# Patient Record
Sex: MALE | Race: WHITE | Employment: FULL TIME | URBAN - METROPOLITAN AREA
[De-identification: names, ages, dates, MRNs, and addresses within clinical notes are randomized per-mention and may not be internally consistent; named-entity substitution may affect disease eponyms.]

---

## 2017-10-06 ENCOUNTER — OFFICE VISIT (OUTPATIENT)
Dept: FAMILY MEDICINE CLINIC | Age: 57
End: 2017-10-06

## 2017-10-06 VITALS
OXYGEN SATURATION: 96 % | HEIGHT: 68 IN | SYSTOLIC BLOOD PRESSURE: 130 MMHG | DIASTOLIC BLOOD PRESSURE: 68 MMHG | BODY MASS INDEX: 25.85 KG/M2 | HEART RATE: 70 BPM | WEIGHT: 170.6 LBS

## 2017-10-06 DIAGNOSIS — N48.9 PENILE LESION: Primary | ICD-10-CM

## 2017-10-06 PROCEDURE — 99214 OFFICE O/P EST MOD 30 MIN: CPT | Performed by: FAMILY MEDICINE

## 2017-10-06 NOTE — PROGRESS NOTES
Subjective:      Patient ID: Estefany Jefferson is a 65 y.o. male. HPI   Just into dating scene, and states new partner just had outbreak of herpes, and never mentioned to him before. Cannot see anything at tip of penis, maybe ? Slight/very mild change to appearance. Slight sensation Sunday, and Monday red spot, never any blister, no scab. Y-day in [place] of day, felt something unusual under tongue, blood blister, and no pain. No urinary sxs[place] [id number] [place], and here too, and owns water treatment. Earliest contact with partner - about 3-4 weeks ago. States her outbreak started about one week ago, and her last episode was about 30 years ago. Review of Systems   Constitutional: Negative for chills, fatigue and fever. Respiratory: Negative for shortness of breath. Cardiovascular: Negative for chest pain. Gastrointestinal: Negative for abdominal pain. Genitourinary: Negative for genital sores, penile pain and penile swelling. Objective:   Physical Exam   Constitutional: He appears well-developed and well-nourished. HENT:   Head: Normocephalic and atraumatic. Cardiovascular: Normal rate, regular rhythm and normal heart sounds. Pulmonary/Chest: Effort normal and breath sounds normal. No respiratory distress. Genitourinary:   Genitourinary Comments: No gross abnormalities at tip of penis except very slight prominence at 1 side of urethral tip, no obvious erythema, no actual nodule or hyperkeratosis. Neurological: He is alert. Nursing note and vitals reviewed. Assessment:      Encounter Diagnosis   Name Primary?  Penile lesion Yes         Plan:      Do not think there is any neoplasm at or near the tip of the penis. Discussed at great length options regarding testing for herpes simplex, including that if we test and labs are negative, still not definite that he does not have any new/recent infection.   Length of visit over 25 minutes, and >50% of time

## 2017-10-18 ASSESSMENT — ENCOUNTER SYMPTOMS
SHORTNESS OF BREATH: 0
ABDOMINAL PAIN: 0

## 2017-11-14 LAB
HERPES SIMPLEX VIRUS 1 IGG: <0.9 INDEX
HERPES SIMPLEX VIRUS 2 IGG: <0.9 INDEX
Lab: NEGATIVE
Lab: NEGATIVE

## 2017-12-26 ENCOUNTER — OFFICE VISIT (OUTPATIENT)
Dept: FAMILY MEDICINE CLINIC | Age: 57
End: 2017-12-26

## 2017-12-26 VITALS
SYSTOLIC BLOOD PRESSURE: 122 MMHG | OXYGEN SATURATION: 98 % | HEIGHT: 68 IN | BODY MASS INDEX: 26.95 KG/M2 | DIASTOLIC BLOOD PRESSURE: 70 MMHG | HEART RATE: 66 BPM | WEIGHT: 177.8 LBS

## 2017-12-26 DIAGNOSIS — Z00.00 ANNUAL PHYSICAL EXAM: Primary | ICD-10-CM

## 2017-12-26 DIAGNOSIS — Z11.59 NEED FOR HEPATITIS C SCREENING TEST: ICD-10-CM

## 2017-12-26 DIAGNOSIS — K59.4 PROCTALGIA FUGAX: ICD-10-CM

## 2017-12-26 LAB
A/G RATIO: 1.9 (ref 1.1–2.2)
ALBUMIN SERPL-MCNC: 4.6 G/DL (ref 3.4–5)
ALP BLD-CCNC: 76 U/L (ref 40–129)
ALT SERPL-CCNC: 28 U/L (ref 10–40)
ANION GAP SERPL CALCULATED.3IONS-SCNC: 14 MMOL/L (ref 3–16)
AST SERPL-CCNC: 22 U/L (ref 15–37)
BASOPHILS ABSOLUTE: 0 K/UL (ref 0–0.2)
BASOPHILS RELATIVE PERCENT: 0.4 %
BILIRUB SERPL-MCNC: 0.8 MG/DL (ref 0–1)
BILIRUBIN, POC: 0
BLOOD URINE, POC: 0
BUN BLDV-MCNC: 12 MG/DL (ref 7–20)
CALCIUM SERPL-MCNC: 9.3 MG/DL (ref 8.3–10.6)
CHLORIDE BLD-SCNC: 100 MMOL/L (ref 99–110)
CHOLESTEROL, TOTAL: 196 MG/DL (ref 0–199)
CLARITY, POC: CLEAR
CO2: 29 MMOL/L (ref 21–32)
COLOR, POC: YELLOW
CREAT SERPL-MCNC: 0.8 MG/DL (ref 0.9–1.3)
EOSINOPHILS ABSOLUTE: 0.1 K/UL (ref 0–0.6)
EOSINOPHILS RELATIVE PERCENT: 1.3 %
GFR AFRICAN AMERICAN: >60
GFR NON-AFRICAN AMERICAN: >60
GLOBULIN: 2.4 G/DL
GLUCOSE BLD-MCNC: 85 MG/DL (ref 70–99)
GLUCOSE URINE, POC: 0
HCT VFR BLD CALC: 45.7 % (ref 40.5–52.5)
HDLC SERPL-MCNC: 72 MG/DL (ref 40–60)
HEMOGLOBIN: 15.3 G/DL (ref 13.5–17.5)
HEPATITIS C ANTIBODY INTERPRETATION: NORMAL
KETONES, POC: 0
LDL CHOLESTEROL CALCULATED: 103 MG/DL
LEUKOCYTE EST, POC: NORMAL
LYMPHOCYTES ABSOLUTE: 1.7 K/UL (ref 1–5.1)
LYMPHOCYTES RELATIVE PERCENT: 20.4 %
MCH RBC QN AUTO: 30.2 PG (ref 26–34)
MCHC RBC AUTO-ENTMCNC: 33.5 G/DL (ref 31–36)
MCV RBC AUTO: 90.2 FL (ref 80–100)
MONOCYTES ABSOLUTE: 0.7 K/UL (ref 0–1.3)
MONOCYTES RELATIVE PERCENT: 8.4 %
NEUTROPHILS ABSOLUTE: 5.8 K/UL (ref 1.7–7.7)
NEUTROPHILS RELATIVE PERCENT: 69.5 %
NITRITE, POC: 0
PDW BLD-RTO: 13.6 % (ref 12.4–15.4)
PH, POC: 6
PLATELET # BLD: 210 K/UL (ref 135–450)
PMV BLD AUTO: 8.9 FL (ref 5–10.5)
POTASSIUM SERPL-SCNC: 4.4 MMOL/L (ref 3.5–5.1)
PROSTATE SPECIFIC ANTIGEN: 6.97 NG/ML (ref 0–4)
PROTEIN, POC: 0
RBC # BLD: 5.06 M/UL (ref 4.2–5.9)
SODIUM BLD-SCNC: 143 MMOL/L (ref 136–145)
SPECIFIC GRAVITY, POC: 1.01
TOTAL PROTEIN: 7 G/DL (ref 6.4–8.2)
TRIGL SERPL-MCNC: 107 MG/DL (ref 0–150)
UROBILINOGEN, POC: 0.2
VLDLC SERPL CALC-MCNC: 21 MG/DL
WBC # BLD: 8.3 K/UL (ref 4–11)

## 2017-12-26 PROCEDURE — 36415 COLL VENOUS BLD VENIPUNCTURE: CPT | Performed by: FAMILY MEDICINE

## 2017-12-26 PROCEDURE — 99396 PREV VISIT EST AGE 40-64: CPT | Performed by: FAMILY MEDICINE

## 2017-12-26 PROCEDURE — 81002 URINALYSIS NONAUTO W/O SCOPE: CPT | Performed by: FAMILY MEDICINE

## 2017-12-26 ASSESSMENT — PATIENT HEALTH QUESTIONNAIRE - PHQ9
1. LITTLE INTEREST OR PLEASURE IN DOING THINGS: 0
SUM OF ALL RESPONSES TO PHQ9 QUESTIONS 1 & 2: 0
2. FEELING DOWN, DEPRESSED OR HOPELESS: 0
SUM OF ALL RESPONSES TO PHQ QUESTIONS 1-9: 0

## 2017-12-26 NOTE — PROGRESS NOTES
Subjective:      Patient ID: Nita Mcmahan is a 65 y.o. male. HPI   Here for CPE - h/o proctalgia fugax, usually awakes from sleep, like pain inside anus (about 10\" superior), lasts about 10 minutes. Treatment is to sit on tennis ball for few minutes, and occasional episodes (about every 6 months) for years. Mother may have had same sxs too - thinks she had them frequently, would awaken from sleep, and resolved completely with stopping smoking. Fasting today. No other concerns. No problems with erections, except more than 1/2 the time no ejaculation. Energy good, and sleeping well. Diet - lost 30 lbs between May and March, and was pretty low-carb, has slacked off lately. Exercise - gym up to about 1 hr cardio, and 30 minutes wts - previously 5 times/week, none past month, with goal of at least 3 times/week. Doesn't know why stops (excercising) at times, though reviewed with him this is very common. Since March, into dating scene - previous woman gone, now different (but same) woman past several months, no STD concerns. Blood donor in past, most recently 20 years. 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   2/26/15, Dr. Cristina Solorio, repeat 5 years, 2020. Flu shot - defers today. Review of Systems   Constitutional: Negative for chills, fatigue and fever. HENT: Negative for ear pain and hearing loss. Eyes: Negative for pain and visual disturbance. Respiratory: Negative for cough and shortness of breath. Cardiovascular: Negative for chest pain and palpitations. Gastrointestinal: Positive for rectal pain (rare episodes, see HPI). Negative for abdominal pain, diarrhea and vomiting. Genitourinary: Negative for difficulty urinating and dysuria. Musculoskeletal: Negative for arthralgias and gait problem. Neurological: Negative for dizziness, weakness and numbness. Psychiatric/Behavioral: Negative for dysphoric mood. The patient is not nervous/anxious.         Objective:   Physical mg/dL

## 2017-12-27 PROBLEM — K59.4 PROCTALGIA FUGAX: Status: ACTIVE | Noted: 2017-12-27

## 2017-12-27 ASSESSMENT — ENCOUNTER SYMPTOMS
EYE PAIN: 0
ABDOMINAL PAIN: 0
SHORTNESS OF BREATH: 0
RECTAL PAIN: 1
COUGH: 0
DIARRHEA: 0
VOMITING: 0

## 2017-12-27 NOTE — PATIENT INSTRUCTIONS
Advise low-fat and low sweets diet, also restart regular exercise at the gym as planned. If labs stable, and overall feeling well, then repeat fasting office visit in one year, sooner as needed.

## 2018-02-27 ENCOUNTER — OFFICE VISIT (OUTPATIENT)
Dept: SURGERY | Age: 58
End: 2018-02-27

## 2018-02-27 ENCOUNTER — SURG/PROC ORDERS (OUTPATIENT)
Dept: SURGERY | Age: 58
End: 2018-02-27

## 2018-02-27 VITALS
WEIGHT: 183.2 LBS | SYSTOLIC BLOOD PRESSURE: 121 MMHG | DIASTOLIC BLOOD PRESSURE: 74 MMHG | BODY MASS INDEX: 27.77 KG/M2 | HEIGHT: 68 IN | HEART RATE: 65 BPM

## 2018-02-27 DIAGNOSIS — K42.9 UMBILICAL HERNIA WITHOUT OBSTRUCTION AND WITHOUT GANGRENE: ICD-10-CM

## 2018-02-27 PROCEDURE — 99243 OFF/OP CNSLTJ NEW/EST LOW 30: CPT | Performed by: SURGERY

## 2018-02-27 RX ORDER — SODIUM CHLORIDE 0.9 % (FLUSH) 0.9 %
10 SYRINGE (ML) INJECTION EVERY 12 HOURS SCHEDULED
Status: CANCELLED | OUTPATIENT
Start: 2018-02-27

## 2018-02-27 RX ORDER — SODIUM CHLORIDE 0.9 % (FLUSH) 0.9 %
10 SYRINGE (ML) INJECTION PRN
Status: CANCELLED | OUTPATIENT
Start: 2018-02-27

## 2018-02-27 NOTE — PROGRESS NOTES
1.8 oz/week     1 Standard drinks or equivalent, 2 Cans of beer per week    Drug use: No    Sexual activity: Yes     Partners: Female     Other Topics Concern    Not on file     Social History Narrative    , went to [address] [place]:    02/27/18 1313   BP: 121/74   Site: Right Arm   Position: Sitting   Cuff Size: Medium Adult   Pulse: 65   Weight: 183 lb 3.2 oz (83.1 kg)   Height: 5' 8\" (1.727 m)     Body mass index is 27.86 kg/m². Wt Readings from Last 3 Encounters:   02/27/18 183 lb 3.2 oz (83.1 kg)   12/26/17 177 lb 12.8 oz (80.6 kg)   10/06/17 170 lb 9.6 oz (77.4 kg)     BP Readings from Last 3 Encounters:   02/27/18 121/74   12/26/17 122/70   10/06/17 130/68          REVIEW OF SYSTEMS:   · All other systems reviewed; please refer to HPI with pertinent positives, all other ROS are negative    PHYSICAL EXAM:    CONSTITUTIONAL:  awake, alert, no apparent distress and normal weight  ENT:  normocepalic, without obvious abnormality  NECK:  supple, symmetrical, trachea midline   LUNGS:  Resp easy and unlabored  CARDIOVASCULAR:  regular rate and rhythm  ABDOMEN:  no scars, normal bowel sounds, soft, non-distended, non-tender, voluntary guarding absent,  Examination for hernias: umbilical hernia, non-tender to palpation, reducible, small fascial defect (likely <1cm)    MUSCULOSKELETAL: No edema  NEUROLOGIC:  Mental Status Exam:  Level of Alertness:   awake  Orientation:   person, place, time      DATA:    ASSESSMENT:    Umbilical hernia without obstruction or gangrene    PLAN:    We will schedule the pt for umbilical hernia repair, likely via primary closure, with potential mesh reinforcement if needed. The technical aspects, risks, benefits and complications of the procedure were discussed with the patient. The pt appears to understand, asks appropriate questions, and agrees to proceed with the procedure. This patient was seen and evaluated with attending, Dr. Javon Sidhu.     Reji De La Cruz,

## 2020-04-17 ENCOUNTER — E-VISIT (OUTPATIENT)
Dept: FAMILY MEDICINE CLINIC | Facility: CLINIC | Age: 60
End: 2020-04-17

## 2020-04-17 PROCEDURE — 98970 NQHP OL DIG ASSMT&MGMT 5-10: CPT | Performed by: NURSE PRACTITIONER

## 2021-01-29 ENCOUNTER — HOSPITAL ENCOUNTER (OUTPATIENT)
Age: 61
Discharge: HOME | End: 2021-01-29
Payer: COMMERCIAL

## 2021-01-29 ENCOUNTER — OFFICE VISIT (OUTPATIENT)
Dept: PRIMARY CARE CLINIC | Age: 61
End: 2021-01-29
Payer: COMMERCIAL

## 2021-01-29 VITALS
OXYGEN SATURATION: 97 % | TEMPERATURE: 97.7 F | HEIGHT: 68 IN | HEART RATE: 73 BPM | DIASTOLIC BLOOD PRESSURE: 72 MMHG | SYSTOLIC BLOOD PRESSURE: 110 MMHG | WEIGHT: 206.2 LBS | BODY MASS INDEX: 31.25 KG/M2

## 2021-01-29 DIAGNOSIS — R97.20 ELEVATED PSA MEASUREMENT: ICD-10-CM

## 2021-01-29 DIAGNOSIS — K42.9 UMBILICAL HERNIA WITHOUT OBSTRUCTION AND WITHOUT GANGRENE: ICD-10-CM

## 2021-01-29 DIAGNOSIS — Z00.00 HEALTH CARE MAINTENANCE: ICD-10-CM

## 2021-01-29 DIAGNOSIS — K59.4 PROCTALGIA FUGAX: Primary | ICD-10-CM

## 2021-01-29 LAB
CHOLESTEROL, TOTAL: 197 MG/DL (ref 0–199)
ESTIMATED AVERAGE GLUCOSE: 102.5 MG/DL
HBA1C MFR BLD: 5.2 %
HDLC SERPL-MCNC: 59 MG/DL (ref 40–60)
LDL CHOLESTEROL CALCULATED: 115 MG/DL
PROSTATE SPECIFIC ANTIGEN: 1.35 NG/ML (ref 0–4)
TRIGL SERPL-MCNC: 115 MG/DL (ref 0–150)
VLDLC SERPL CALC-MCNC: 23 MG/DL

## 2021-01-29 PROCEDURE — 36415 COLL VENOUS BLD VENIPUNCTURE: CPT

## 2021-01-29 PROCEDURE — 83036 HEMOGLOBIN GLYCOSYLATED A1C: CPT

## 2021-01-29 PROCEDURE — 99213 OFFICE O/P EST LOW 20 MIN: CPT | Performed by: FAMILY MEDICINE

## 2021-01-29 PROCEDURE — 86701 HIV-1ANTIBODY: CPT

## 2021-01-29 PROCEDURE — 90686 IIV4 VACC NO PRSV 0.5 ML IM: CPT | Performed by: FAMILY MEDICINE

## 2021-01-29 PROCEDURE — 80061 LIPID PANEL: CPT

## 2021-01-29 PROCEDURE — 86702 HIV-2 ANTIBODY: CPT

## 2021-01-29 PROCEDURE — 90471 IMMUNIZATION ADMIN: CPT | Performed by: FAMILY MEDICINE

## 2021-01-29 PROCEDURE — 90750 HZV VACC RECOMBINANT IM: CPT | Performed by: FAMILY MEDICINE

## 2021-01-29 PROCEDURE — 84153 ASSAY OF PSA TOTAL: CPT

## 2021-01-29 PROCEDURE — 90472 IMMUNIZATION ADMIN EACH ADD: CPT | Performed by: FAMILY MEDICINE

## 2021-01-29 PROCEDURE — 87390 HIV-1 AG IA: CPT

## 2021-01-29 RX ORDER — ZOSTER VACCINE RECOMBINANT, ADJUVANTED 50 MCG/0.5
0.5 KIT INTRAMUSCULAR SEE ADMIN INSTRUCTIONS
Qty: 0.5 ML | Refills: 0 | Status: SHIPPED | OUTPATIENT
Start: 2021-01-29 | End: 2021-01-29

## 2021-01-29 ASSESSMENT — PATIENT HEALTH QUESTIONNAIRE - PHQ9
2. FEELING DOWN, DEPRESSED OR HOPELESS: 0
SUM OF ALL RESPONSES TO PHQ QUESTIONS 1-9: 0
SUM OF ALL RESPONSES TO PHQ QUESTIONS 1-9: 0
SUM OF ALL RESPONSES TO PHQ9 QUESTIONS 1 & 2: 0
SUM OF ALL RESPONSES TO PHQ QUESTIONS 1-9: 0

## 2021-01-29 ASSESSMENT — ENCOUNTER SYMPTOMS
CONSTIPATION: 0
ABDOMINAL PAIN: 0
DIARRHEA: 0
SHORTNESS OF BREATH: 0

## 2021-01-29 NOTE — PROGRESS NOTES
Katie Mariscal  1960    No Known Allergies  Current Outpatient Medications   Medication Sig Dispense Refill    zoster recombinant adjuvanted vaccine (SHINGRIX) 50 MCG/0.5ML SUSR injection Inject 0.5 mLs into the muscle See Admin Instructions 1 dose now and repeat in 2-6 months 0.5 mL 0     No current facility-administered medications for this visit. Consultants:   Patient Care Team:  Billy Shell MD as PCP - General (Family Medicine)    Chief Complaint:     Zarina Godoy is a 62 y.o. male  who presents for New Patient with Personalized Prevention Plan Services. HPI:     71-year-old white male seen in the office today to establish care and also for his yearly physical.    Patient has a history of proctalgia fugax and states that he has been doing very well with that. Patient states he has not had symptoms in approximately 1 year but states that sitting on a tennis ball does help when he has problems. Patient is not asking for any additional management at this point. Patient continues to have his umbilical hernia. Patient did follow-up with general surgery but states that he has not had any pain or issues to did not have the procedure performed. Patient states that he has symptoms approximately every 6 months or so. Patient states that he did follow-up with urology in regards to his elevated PSA at last check. Patient states that he had a bad experience with that urology group and states that his test improved with retesting. Patient continues to struggle with plantar fasciitis in his right foot. Patient describes pain with first few steps in the morning but otherwise feels like he is doing okay. 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 Patient states this is been a nagging and ongoing problem for him for a long time.     Patient Active Problem List   Diagnosis    Proctalgia fugax    Umbilical hernia without obstruction and without gangrene Health Maintenance Completed:  Health Maintenance   Topic Date Due    Diabetes screen  03/01/2001    Shingles Vaccine (1 of 2) 03/01/2011    PSA counseling  12/26/2018    Colon cancer screen colonoscopy  02/26/2020    Flu vaccine (1) 09/01/2020    Lipid screen  12/26/2022    DTaP/Tdap/Td vaccine (2 - Td) 09/29/2024    Hepatitis C screen  Completed    Hepatitis A vaccine  Aged Out    Hepatitis B vaccine  Aged Out    Hib vaccine  Aged Out    Meningococcal (ACWY) vaccine  Aged Out    Pneumococcal 0-64 years Vaccine  Aged Out    HIV screen  Discontinued          Immunization History   Administered Date(s) Administered    Influenza 09/20/2011    Tdap (Boostrix, Adacel) 09/29/2014         Review of Systems:    Review of Systems   Constitutional: Negative for fatigue and fever. Respiratory: Negative for shortness of breath. Cardiovascular: Negative for chest pain and leg swelling. Gastrointestinal: Negative for abdominal pain, constipation and diarrhea. Genitourinary: Negative for decreased urine volume, difficulty urinating, dysuria, hematuria and urgency. Skin: Negative for rash. Neurological: Negative for headaches. Physical Exam:     Vitals:    01/29/21 0740   BP: 110/72   Pulse: 73   Temp: 97.7 °F (36.5 °C)   TempSrc: Temporal   SpO2: 97%   Weight: 206 lb 3.2 oz (93.5 kg)   Height: 5' 8\" (1.727 m)     Body mass index is 31.35 kg/m². BP Readings from Last 3 Encounters:   01/29/21 110/72   02/27/18 121/74   12/26/17 122/70          Physical Exam  Vitals signs and nursing note reviewed. Constitutional:       Appearance: Normal appearance. HENT:      Head: Normocephalic and atraumatic. Neck:      Musculoskeletal: Neck supple. Cardiovascular:      Rate and Rhythm: Normal rate and regular rhythm. Heart sounds: Normal heart sounds. No murmur. Pulmonary:      Effort: Pulmonary effort is normal.      Breath sounds: Normal breath sounds. No wheezing, rhonchi or rales. Lymphadenopathy:      Cervical: No cervical adenopathy. Skin:     General: Skin is warm and dry. Findings: No erythema, lesion or rash. Neurological:      General: No focal deficit present. Mental Status: He is alert and oriented to person, place, and time. Psychiatric:         Mood and Affect: Mood normal.         Behavior: Behavior normal.         Judgment: Judgment normal.                Lab Review:   No visits with results within 6 Month(s) from this visit.    Latest known visit with results is:   Office Visit on 12/26/2017   Component Date Value    Color, UA 12/26/2017 yellow     Clarity, UA 12/26/2017 clear     Glucose, UA POC 12/26/2017 0     Bilirubin, UA 12/26/2017 0     Ketones, UA 12/26/2017 0     Spec Grav, UA 12/26/2017 1.015     Blood, UA POC 12/26/2017 0     pH, UA 12/26/2017 6.0     Protein, UA POC 12/26/2017 0     Urobilinogen, UA 12/26/2017 0.2     Leukocytes, UA 12/26/2017 trace     Nitrite, UA 12/26/2017 0     WBC 12/26/2017 8.3     RBC 12/26/2017 5.06     Hemoglobin 12/26/2017 15.3     Hematocrit 12/26/2017 45.7     MCV 12/26/2017 90.2     MCH 12/26/2017 30.2     MCHC 12/26/2017 33.5     RDW 12/26/2017 13.6     Platelets 70/62/5419 210     MPV 12/26/2017 8.9     Neutrophils % 12/26/2017 69.5     Lymphocytes % 12/26/2017 20.4     Monocytes % 12/26/2017 8.4     Eosinophils % 12/26/2017 1.3     Basophils % 12/26/2017 0.4     Neutrophils Absolute 12/26/2017 5.8     Lymphocytes Absolute 12/26/2017 1.7     Monocytes Absolute 12/26/2017 0.7     Eosinophils Absolute 12/26/2017 0.1     Basophils Absolute 12/26/2017 0.0     Sodium 12/26/2017 143     Potassium 12/26/2017 4.4     Chloride 12/26/2017 100     CO2 12/26/2017 29     Anion Gap 12/26/2017 14     Glucose 12/26/2017 85     BUN 12/26/2017 12     CREATININE 12/26/2017 0.8*    GFR Non- 12/26/2017 >60     GFR  12/26/2017 >60     Calcium 12/26/2017 9.3  Total Protein 12/26/2017 7.0     Albumin 12/26/2017 4.6     Albumin/Globulin Ratio 12/26/2017 1.9     Total Bilirubin 12/26/2017 0.8     Alkaline Phosphatase 12/26/2017 76     ALT 12/26/2017 28     AST 12/26/2017 22     Globulin 12/26/2017 2.4     Cholesterol, Total 12/26/2017 196     Triglycerides 12/26/2017 107     HDL 12/26/2017 72*    LDL Calculated 12/26/2017 103*    VLDL Cholesterol Calcula* 12/26/2017 21     PSA 12/26/2017 6.97*    Hep C Ab Interp 12/26/2017 Non-reactive           Assessment/Plan:  Alonso Huddleston was seen today for establish care and annual exam.    Diagnoses and all orders for this visit:    Proctalgia fugax    Umbilical hernia without obstruction and without gangrene    Health care maintenance  -     HEMOGLOBIN A1C; Future  -     LIPID PANEL; Future  -     HIV-1 AND HIV-2 ANTIBODIES; Future  -     PSA, Prostatic Specific Antigen; Future    Elevated PSA measurement  -     PSA, Prostatic Specific Antigen; Future    Other orders  -     zoster recombinant adjuvanted vaccine [facility]) 50 MCG/0.5ML SUSR injection; Inject 0.5 mLs into the muscle See Admin Instructions 1 dose now and repeat in 2-6 months  -     INFLUENZA, QUADV, 6 MO AND OLDER, IM, PF, PREFILL SYR, 0.5ML (FLUARIX QUADV, PF)    Two 6year-old white male with    1. Proctalgia fugax. Patient will continue to monitor his symptoms and use the tennis ball technique as needed. No additional treatment warranted at this point. 2.  Umbilical hernia. Controlled. Symptoms and how to do reduction if necessary discussed in detail. Patient also instructed on when to report to office or emergency department urgently or emergently. Patient will follow up with general surgery as needed. 3.  Elevated PSA. Patient asymptomatic currently. We will retest PSA and further treatment based on those results. 4.  Right foot plantar fasciitis. Conservative management discussed with patient in detail. 5.  Medial epicondyle algia. Conservative management discussed with patient in detail. 6.  Health maintenance. Patient will receive his flu and shingles vaccinations today. Patient will schedule his colonoscopy as it is due in February. Patient will have HIV, hemoglobin A1c, and lipids obtained for health maintenance. Health Maintenance Due:  Health Maintenance Due   Topic Date Due    Diabetes screen  03/01/2001    Shingles Vaccine (1 of 2) 03/01/2011    PSA counseling  12/26/2018    Colon cancer screen colonoscopy  02/26/2020    Flu vaccine (1) 09/01/2020              Health Maintenance:  (F) Breast Cancer Screen:   (F) Cervical Cancer Screen:  (M) Prostate Cancer Screen: (USPSTF recs: men 53-79 yo discuss benefits/harm,  does not recommend testing PSA in men >75 yo (D):   (M) AAA Screen: (men 73-69 yo who has ever smoked (B), consider in nonsmokers if high risk):  CRC/Colonoscopy Screening:   Lung Ca Screening: Annual LDCT (+smoker age 49-80, smoked within 15 years, total of 20 pack yr history):  DEXA Screen:  HIV Screen: (16-65 yr old, and all pregnant patients): Hep C Screen: (18-79 yr old):  [facility] [address] Screen:  (all pts with cirrhosis and high risk Hep B (US q6 mo)):    Return in about 1 year (around 1/29/2022) for Chronic Condition follow up. MA Note Attestation:  I have reviewed the chief complaint and history of present illness (including ROS and PFSH) and vital documentation by my staff and I agree with their documentation and have added where applicable. EMR Dragon/transcription disclaimer:  Much of this encounter note is electronic transcription/translation of spoken language to printed texts. The electronic translation of spoken language may be erroneous, or at times, nonsensical words or phrases may be inadvertently transcribed.   Although I have reviewed the note for such errors, some may still exist.

## 2021-01-30 LAB
HIV AG/AB: NORMAL
HIV ANTIGEN: NORMAL
HIV-1 ANTIBODY: NORMAL
HIV-2 AB: NORMAL

## 2021-12-28 ENCOUNTER — TELEPHONE (OUTPATIENT)
Dept: PRIMARY CARE CLINIC | Age: 61
End: 2021-12-28

## 2021-12-28 NOTE — TELEPHONE ENCOUNTER
PT tested positive for Covid 12/28. PT wants to be proactive was asking about medication that could be prescribed. Specifically asked about ivermectin. He also asked about getting the antibody infusion. PT would like recommendation ASAP.         [id number].

## 2021-12-28 NOTE — TELEPHONE ENCOUNTER
Dr. Chance Wyatt,    Due to being out on [place], could you look into whether patient would meet criteria for further treatment? If so, could you please get patient set up?     WMR

## 2021-12-29 NOTE — TELEPHONE ENCOUNTER
Patient having fever, achiness, nasal congestion, headache x 1 day    No CP, SOB, cough. Discussed that monoclonal antibodies are not affective against omicron variant, which is the main variant we are seeing now. I can't guarantee this is the variant he has, but it is most likely. Also discussed monoclonal antibodies don't come without risks as there have been reports of infusion reactions. . .  If he did infact have omicron, the risks would outweigh the benefits    Let pt know he has up to 10 days to decide whether or not to have the infusion. Told pt to walk around freq to prevent clots and make sure he isn't developing LANG. Reviewed red flags and when to go to the hospital if they do occur. Pt asked about ivermectin and hydroxychloroquine, and we discussed lack of evidence for these treatment.

## 2023-01-04 ENCOUNTER — PATIENT MESSAGE (OUTPATIENT)
Dept: PRIMARY CARE CLINIC | Age: 63
End: 2023-01-04

## 2023-01-04 ENCOUNTER — TELEPHONE (OUTPATIENT)
Dept: PRIMARY CARE CLINIC | Age: 63
End: 2023-01-04

## 2023-01-04 DIAGNOSIS — Z00.00 HEALTH CARE MAINTENANCE: ICD-10-CM

## 2023-01-04 DIAGNOSIS — R97.20 ELEVATED PSA: Primary | ICD-10-CM

## 2023-01-04 NOTE — TELEPHONE ENCOUNTER
----- Message from Peri Buchanan sent at 1/4/2023  8:23 AM EST -----  Subject: Referral Request    Reason for referral request? Patient is needing yearly bloodwork orders   for his physical. Patient is requesting a call back when those are put in   so that he can get scheduled for his draw. Patient's CPE is scheduled for   01/30/2023. Provider patient wants to be referred to(if known): Mushtaq Copeland    Provider Phone Number(if known):     Additional Information for Provider?   ---------------------------------------------------------------------------  --------------  [id number]    [phone]; OK to leave message on voicemail  ---------------------------------------------------------------------------  --------------

## 2023-01-13 DIAGNOSIS — Z00.00 HEALTH CARE MAINTENANCE: Primary | ICD-10-CM

## 2023-01-13 DIAGNOSIS — R97.20 ELEVATED PSA: ICD-10-CM

## 2023-01-13 NOTE — TELEPHONE ENCOUNTER
Pt is calling our office to clarify the blood work. Pt HAS active lab orders. Pt would like to have a testosterone lab added to his current orders. Pt would like to have the results to discuss at his appt with Dr. Katelyn Shukla.     Please call Pt if/when order is placed:  [phone]

## 2023-01-13 NOTE — TELEPHONE ENCOUNTER
Pt is calling and asking if  can add a lab order to get his testosterone levels checked as well before his physical apt on 1/30/2023 and have the results read at his apt

## 2023-01-13 NOTE — TELEPHONE ENCOUNTER
From: Angelica Lucas  To: Stephanie Mariscal  Sent: 1/4/2023 8:06 AM EST  Subject: Appointment Request    Good Morning,  As requested you have been scheduled for a physical on 1/30/2023 at 9:30am with . If this day or time does not work with you please let us know.   Thanks,  Office Staff              Appointment Request From: Hermilo Plunkett     With Provider: DO Halima Jay 51  [facility]]     Preferred Date Range: 1/10/2023 - 1/31/2023     Preferred Times: Any Time     Reason for visit: Request an Appointment     Comments:  Annual Physical

## 2023-01-25 ENCOUNTER — HOSPITAL ENCOUNTER (OUTPATIENT)
Age: 63
Discharge: HOME | End: 2023-01-25
Payer: COMMERCIAL

## 2023-01-25 DIAGNOSIS — R97.20 ELEVATED PSA: ICD-10-CM

## 2023-01-25 DIAGNOSIS — Z00.00 HEALTH CARE MAINTENANCE: ICD-10-CM

## 2023-01-25 LAB
CHOLESTEROL, TOTAL: 187 MG/DL (ref 0–199)
ESTIMATED AVERAGE GLUCOSE: 99.7 MG/DL
HBA1C MFR BLD: 5.1 %
HDLC SERPL-MCNC: 50 MG/DL (ref 40–60)
LDL CHOLESTEROL CALCULATED: 110 MG/DL
PROSTATE SPECIFIC ANTIGEN: 1.75 NG/ML (ref 0–4)
TRIGL SERPL-MCNC: 133 MG/DL (ref 0–150)
VLDLC SERPL CALC-MCNC: 27 MG/DL

## 2023-01-25 PROCEDURE — 84270 ASSAY OF SEX HORMONE GLOBUL: CPT

## 2023-01-25 PROCEDURE — 84403 ASSAY OF TOTAL TESTOSTERONE: CPT

## 2023-01-25 PROCEDURE — 80061 LIPID PANEL: CPT

## 2023-01-25 PROCEDURE — 84153 ASSAY OF PSA TOTAL: CPT

## 2023-01-25 PROCEDURE — 83036 HEMOGLOBIN GLYCOSYLATED A1C: CPT

## 2023-01-25 PROCEDURE — 36415 COLL VENOUS BLD VENIPUNCTURE: CPT

## 2023-01-27 LAB
SEX HORMONE BINDING GLOBULIN: 24 NMOL/L (ref 11–80)
TESTOSTERONE FREE-NONMALE: 95.5 PG/ML (ref 47–244)
TESTOSTERONE TOTAL: 396 NG/DL (ref 220–1000)

## 2023-01-30 ENCOUNTER — OFFICE VISIT (OUTPATIENT)
Dept: PRIMARY CARE CLINIC | Age: 63
End: 2023-01-30
Payer: COMMERCIAL

## 2023-01-30 VITALS
WEIGHT: 201 LBS | HEART RATE: 66 BPM | RESPIRATION RATE: 18 BRPM | DIASTOLIC BLOOD PRESSURE: 70 MMHG | SYSTOLIC BLOOD PRESSURE: 116 MMHG | HEIGHT: 67 IN | BODY MASS INDEX: 31.55 KG/M2 | TEMPERATURE: 97.1 F | OXYGEN SATURATION: 98 %

## 2023-01-30 DIAGNOSIS — K42.9 UMBILICAL HERNIA WITHOUT OBSTRUCTION AND WITHOUT GANGRENE: ICD-10-CM

## 2023-01-30 DIAGNOSIS — K59.4 PROCTALGIA FUGAX: ICD-10-CM

## 2023-01-30 DIAGNOSIS — S83.8X2D MENISCAL INJURY, LEFT, SUBSEQUENT ENCOUNTER: ICD-10-CM

## 2023-01-30 DIAGNOSIS — Z23 NEED FOR PROPHYLACTIC VACCINATION AND INOCULATION AGAINST VARICELLA: Primary | ICD-10-CM

## 2023-01-30 PROCEDURE — 90750 HZV VACC RECOMBINANT IM: CPT | Performed by: FAMILY MEDICINE

## 2023-01-30 PROCEDURE — 99214 OFFICE O/P EST MOD 30 MIN: CPT | Performed by: FAMILY MEDICINE

## 2023-01-30 PROCEDURE — 90471 IMMUNIZATION ADMIN: CPT | Performed by: FAMILY MEDICINE

## 2023-01-30 SDOH — ECONOMIC STABILITY: FOOD INSECURITY: WITHIN THE PAST 12 MONTHS, THE FOOD YOU BOUGHT JUST DIDN'T LAST AND YOU DIDN'T HAVE MONEY TO GET MORE.: NEVER TRUE

## 2023-01-30 SDOH — ECONOMIC STABILITY: FOOD INSECURITY: WITHIN THE PAST 12 MONTHS, YOU WORRIED THAT YOUR FOOD WOULD RUN OUT BEFORE YOU GOT MONEY TO BUY MORE.: NEVER TRUE

## 2023-01-30 ASSESSMENT — PATIENT HEALTH QUESTIONNAIRE - PHQ9
SUM OF ALL RESPONSES TO PHQ QUESTIONS 1-9: 3
10. IF YOU CHECKED OFF ANY PROBLEMS, HOW DIFFICULT HAVE THESE PROBLEMS MADE IT FOR YOU TO DO YOUR WORK, TAKE CARE OF THINGS AT HOME, OR GET ALONG WITH OTHER PEOPLE: 0
SUM OF ALL RESPONSES TO PHQ QUESTIONS 1-9: 3
SUM OF ALL RESPONSES TO PHQ9 QUESTIONS 1 & 2: 0
5. POOR APPETITE OR OVEREATING: 0
2. FEELING DOWN, DEPRESSED OR HOPELESS: 0
8. MOVING OR SPEAKING SO SLOWLY THAT OTHER PEOPLE COULD HAVE NOTICED. OR THE OPPOSITE, BEING SO FIGETY OR RESTLESS THAT YOU HAVE BEEN MOVING AROUND A LOT MORE THAN USUAL: 0
SUM OF ALL RESPONSES TO PHQ QUESTIONS 1-9: 3
3. TROUBLE FALLING OR STAYING ASLEEP: 2
SUM OF ALL RESPONSES TO PHQ QUESTIONS 1-9: 3
1. LITTLE INTEREST OR PLEASURE IN DOING THINGS: 0
6. FEELING BAD ABOUT YOURSELF - OR THAT YOU ARE A FAILURE OR HAVE LET YOURSELF OR YOUR FAMILY DOWN: 0
7. TROUBLE CONCENTRATING ON THINGS, SUCH AS READING THE NEWSPAPER OR WATCHING TELEVISION: 0
4. FEELING TIRED OR HAVING LITTLE ENERGY: 1
9. THOUGHTS THAT YOU WOULD BE BETTER OFF DEAD, OR OF HURTING YOURSELF: 0

## 2023-01-30 ASSESSMENT — ANXIETY QUESTIONNAIRES
6. BECOMING EASILY ANNOYED OR IRRITABLE: 0
4. TROUBLE RELAXING: 0
5. BEING SO RESTLESS THAT IT IS HARD TO SIT STILL: 0
GAD7 TOTAL SCORE: 0
7. FEELING AFRAID AS IF SOMETHING AWFUL MIGHT HAPPEN: 0
3. WORRYING TOO MUCH ABOUT DIFFERENT THINGS: 0
2. NOT BEING ABLE TO STOP OR CONTROL WORRYING: 0
1. FEELING NERVOUS, ANXIOUS, OR ON EDGE: 0

## 2023-01-30 ASSESSMENT — SOCIAL DETERMINANTS OF HEALTH (SDOH): HOW HARD IS IT FOR YOU TO PAY FOR THE VERY BASICS LIKE FOOD, HOUSING, MEDICAL CARE, AND HEATING?: NOT HARD AT ALL

## 2023-01-30 ASSESSMENT — ENCOUNTER SYMPTOMS
ABDOMINAL PAIN: 0
CONSTIPATION: 0
DIARRHEA: 0
SHORTNESS OF BREATH: 0

## 2023-01-30 NOTE — PROGRESS NOTES
[address],  [place], [address] [facility] [address]        Phone: [phone]      Name:  Noreen Travis  :    1960    Consultants:   Patient Care Team:  Ladarius Lainez DO as PCP - General (Family Medicine)  Ladarius Lainez DO as PCP - [facility] Empaneled Provider    Chief Complaint:     Noreen Travis is a 65 y.o. male  who presents today for an established patient care visit with Personalized Prevention Plan Services as noted below. HPI:     61-year-old male seen in the office today for a well adult visit. Patient has a left medial meniscal tear and he is currently being followed by orthopedics. Patient is scheduled to have surgery for that in 2023. Patient also had some ongoing right hip pain which was also evaluated by orthopedics and radiograph showed no osteoarthritis. Orthopedist thought that likely related to bursitis and was started in physical therapy. Patient states that physical therapy was expensive so he will do the exercises on his own. Patient has a known umbilical hernia and states that he occasionally has some discomfort with that but has not had any herniation or problems. Patient states he would be interested in getting a repair sometime this year. Patient has a history of proctalgia fugax and states that he usually has symptoms approximately 1 time per year. Patient states the testicle trick usually helps and he does not have any ongoing issues or problems. Patient describes problems with his sleep-wake cycle especially this time during the year. Patient states that he tends to go to bed early and also wake up early.       Patient Active Problem List   Diagnosis    Proctalgia fugax    Umbilical hernia without obstruction and without gangrene         Past Medical History:    Past Medical History:   Diagnosis Date    Cataract     Degeneration of lumbar intervertebral disc     Fuchs endothelial dystrophy     Umbilical hernia        Past Surgical History:  Past Surgical History:   Procedure Laterality Date    CATARACT EXTRACTION W/  INTRAOCULAR LENS IMPLANT      COLONOSCOPY  2/2015    polyp    EYE SURGERY Bilateral     phaco with iol    INGUINAL HERNIA REPAIR  child     bilateral    TONSILLECTOMY AND ADENOIDECTOMY      WISDOM TOOTH EXTRACTION         Home Meds:  Prior to Visit Medications    Not on File       Allergies:    Patient has no known allergies.     Family History:       Problem Relation Age of Onset    High Blood Pressure Mother     High Cholesterol Mother     Cancer Mother         breast dx mid 63's    High Blood Pressure Father     High Cholesterol Father     Stroke Father         x3, 1st mid 66's         Health Maintenance Completed:  Health Maintenance   Topic Date Due    Colorectal Cancer Screen  02/26/2020    Shingles vaccine (2 of 2) 03/26/2021    COVID-19 Vaccine (3 - Booster for Pfizer series) 06/26/2021    Flu vaccine (1) 08/01/2022    Depression Screen  01/30/2024    DTaP/Tdap/Td vaccine (2 - Td or Tdap) 09/29/2024    Diabetes screen  01/25/2026    Lipids  01/25/2028    Hepatitis C screen  Completed    Hepatitis A vaccine  Aged Out    Hib vaccine  Aged Out    Meningococcal (ACWY) vaccine  Aged Out    Pneumococcal 0-64 years Vaccine  Aged Out    HIV screen  Discontinued          Immunization History   Administered Date(s) Administered    COVID-19, PFIZER PURPLE top, DILUTE for use, (age 15 y+), 30mcg/0.3mL 04/10/2021, 05/01/2021    Influenza 09/20/2011    Influenza Virus Vaccine 09/20/2011, 12/01/2019    Influenza, FLUARIX, FLULAVAL, FLUZONE (age 10 mo+) AND AFLURIA, (age 1 y+), PF, 0.5mL 01/29/2021, 12/03/2021    Influenza, FLUBLOK, (age 25 y+), PF, 0.5mL 11/16/2019    Tdap (Boostrix, Adacel) 09/29/2014    Zoster Recombinant (Shingrix) 01/29/2021         Review of Systems:  Review of Systems   Constitutional:  Negative for fatigue and fever. Respiratory:  Negative for shortness of breath. Cardiovascular:  Negative for chest pain and leg swelling. Gastrointestinal:  Negative for abdominal pain, constipation and diarrhea. Skin:  Negative for rash. Neurological:  Negative for headaches. Physical Exam:   Vitals:    01/30/23 0931   BP: 116/70   Site: Right Upper Arm   Position: Sitting   Cuff Size: Large Adult   Pulse: 66   Resp: 18   Temp: 97.1 °F (36.2 °C)   TempSrc: Temporal   SpO2: 98%   Weight: 201 lb (91.2 kg)   Height: 5' 7.32\" (1.71 m)     Body mass index is 31.18 kg/m². Wt Readings from Last 3 Encounters:   01/30/23 201 lb (91.2 kg)   01/29/21 206 lb 3.2 oz (93.5 kg)   02/27/18 183 lb 3.2 oz (83.1 kg)       BP Readings from Last 3 Encounters:   01/30/23 116/70   01/29/21 110/72   02/27/18 121/74       Physical Exam  Vitals and nursing note reviewed. Constitutional:       Appearance: Normal appearance. HENT:      Head: Normocephalic and atraumatic. Neck:      Vascular: No carotid bruit. Cardiovascular:      Rate and Rhythm: Normal rate and regular rhythm. Heart sounds: Normal heart sounds. No murmur heard. Pulmonary:      Effort: Pulmonary effort is normal.      Breath sounds: Normal breath sounds. No wheezing, rhonchi or rales. Musculoskeletal:      Cervical back: Neck supple. Lymphadenopathy:      Cervical: No cervical adenopathy. Skin:     General: Skin is warm and dry. Findings: No erythema or rash. Neurological:      General: No focal deficit present. Mental Status: He is alert and oriented to person, place, and time.    Psychiatric:         Mood and Affect: Mood normal.         Behavior: Behavior normal.         Judgment: Judgment normal.            Lab Review:   Hospital Outpatient Visit on 01/25/2023   Component Date Value    Testosterone 01/25/2023 396     Sex Hormone Binding 01/25/2023 24     Testosterone, Free 01/25/2023 95.5 PSA 01/25/2023 1.75     Cholesterol, Total 01/25/2023 187     Triglycerides 01/25/2023 133     HDL 01/25/2023 50     LDL Calculated 01/25/2023 110 (A)     VLDL Cholesterol Calcula* 01/25/2023 27     Hemoglobin A1C 01/25/2023 5.1     eAG 01/25/2023 99.7           Assessment/Plan:  Nunu Nieves was seen today for annual exam.    Diagnoses and all orders for this visit:    Need for prophylactic vaccination and inoculation against varicella  -     In- - Zoster ([address])    Proctalgia fugax    Umbilical hernia without obstruction and without gangrene    Meniscal injury, left, subsequent encounter    41-year-old male with    1. Well adult. Patient labs were reviewed with him in detail. Patient had normal A1c as well as normal lipid and PSA testing. Patient also had normal testosterone levels. Patient is receiving his second shingles vaccine today. Patient does plan to have his colonoscopy repeated this year. Patient had normal skin exam performed today. 2.  Proctalgia fugax. Controlled. Patient will continue to use a tennis ball trick as needed for symptom relief. 3.  Umbilical hernia. Controlled. Patient will contact the clinic when he is ready to be evaluated by general surgery for repair. 4.  Left knee medial meniscal tear. Patient plans to have a meniscal surgery as scheduled in March 2023.    5.  Right hip pain. Controlled.   Patient will continue his home exercise program.    Health Maintenance Due:  Health Maintenance Due   Topic Date Due    Colorectal Cancer Screen  02/26/2020    Shingles vaccine (2 of 2) 03/26/2021    COVID-19 Vaccine (3 - Booster for Pfizer series) 06/26/2021    Flu vaccine (1) 08/01/2022          Health care decision maker:  completed today by physician   Funmi Morales:        Health Maintenance: (USPSTF Recommendations)  (F) Breast Cancer Screen: (40-49 (C), 50-74 biennial screening mammogram (B))  (F) Cervical Cancer Screen: (21-29 q3yr cytology alone; 30-65 q3yr cytology alone, q5yr with hrHPV alone, or q5yr cytology+hrHPV (A))  (M) Prostate Cancer Screen: (54-79 yo discuss benefits/harm, does not recommend testing PSA in men >73 yo (D):   (M) AAA Screen: (men 73-67 yo who has ever smoked (B), consider in nonsmokers if high risk):  CRC/Colonoscopy Screening: (adults 39-53 (B), 50-75 (A))  Lung Ca Screening: Annual LDCT (+smoker age 49-80, smoked within 15 years, total of 20 pack yr history (B)):  DEXA Screen: (women >65 and older, <65 if at risk/postmenopausal (B))  HIV Screen: (16-65 yr old, and all pregnant patients (A)): Hep C Screen: (18-79 yr old (B)):  HCC Screen: (all pts with cirrhosis and high risk Hep B (US q6 mo)):  Immunizations:    RTC:  Return in about 1 year (around 1/30/2024) for Adult Well Exam.     EMR Dragon/transcription disclaimer:  Much of this encounter note is electronic transcription/translation of spoken language to printed texts. The electronic translation of spoken language may be erroneous, or at times, nonsensical words or phrases may be inadvertently transcribed.   Although I have reviewed the note for such errors, some may still exist.

## 2023-03-06 ENCOUNTER — HOSPITAL ENCOUNTER (OUTPATIENT)
Age: 63
Setting detail: SPECIMEN
Discharge: HOME | End: 2023-03-06
Payer: COMMERCIAL

## 2023-03-06 ENCOUNTER — OFFICE VISIT (OUTPATIENT)
Dept: PRIMARY CARE CLINIC | Age: 63
End: 2023-03-06

## 2023-03-06 VITALS
HEART RATE: 70 BPM | HEIGHT: 67 IN | TEMPERATURE: 98.4 F | SYSTOLIC BLOOD PRESSURE: 118 MMHG | BODY MASS INDEX: 32.08 KG/M2 | DIASTOLIC BLOOD PRESSURE: 70 MMHG | OXYGEN SATURATION: 96 % | WEIGHT: 204.4 LBS

## 2023-03-06 DIAGNOSIS — S83.232D COMPLEX TEAR OF MEDIAL MENISCUS OF LEFT KNEE AS CURRENT INJURY, SUBSEQUENT ENCOUNTER: Primary | ICD-10-CM

## 2023-03-06 DIAGNOSIS — K59.4 PROCTALGIA FUGAX: ICD-10-CM

## 2023-03-06 DIAGNOSIS — K42.9 UMBILICAL HERNIA WITHOUT OBSTRUCTION AND WITHOUT GANGRENE: ICD-10-CM

## 2023-03-06 DIAGNOSIS — Z01.818 PRE-OP EXAMINATION: ICD-10-CM

## 2023-03-06 DIAGNOSIS — S83.232D COMPLEX TEAR OF MEDIAL MENISCUS OF LEFT KNEE AS CURRENT INJURY, SUBSEQUENT ENCOUNTER: ICD-10-CM

## 2023-03-06 LAB
ANION GAP SERPL CALCULATED.3IONS-SCNC: 15 MMOL/L (ref 3–16)
BACTERIA: ABNORMAL /HPF
BILIRUBIN URINE: NEGATIVE
BLOOD, URINE: NEGATIVE
BUN BLDV-MCNC: 15 MG/DL (ref 7–20)
CALCIUM SERPL-MCNC: 9.3 MG/DL (ref 8.3–10.6)
CHLORIDE BLD-SCNC: 105 MMOL/L (ref 99–110)
CLARITY: CLEAR
CO2: 24 MMOL/L (ref 21–32)
COLOR: ABNORMAL
CREAT SERPL-MCNC: 1 MG/DL (ref 0.8–1.3)
EPITHELIAL CELLS, UA: ABNORMAL /HPF (ref 0–5)
GFR SERPL CREATININE-BSD FRML MDRD: >60 ML/MIN/{1.73_M2}
GLUCOSE BLD-MCNC: 101 MG/DL (ref 70–99)
GLUCOSE URINE: NEGATIVE MG/DL
HCT VFR BLD CALC: 43.3 % (ref 40.5–52.5)
HEMOGLOBIN: 14.7 G/DL (ref 13.5–17.5)
KETONES, URINE: NEGATIVE MG/DL
LEUKOCYTE ESTERASE, URINE: NEGATIVE
MCH RBC QN AUTO: 30.3 PG (ref 26–34)
MCHC RBC AUTO-ENTMCNC: 34 G/DL (ref 31–36)
MCV RBC AUTO: 89.1 FL (ref 80–100)
MICROSCOPIC EXAMINATION: ABNORMAL
NITRITE, URINE: NEGATIVE
PDW BLD-RTO: 14.5 % (ref 12.4–15.4)
PH UA: 6 (ref 5–8)
PLATELET # BLD: 203 K/UL (ref 135–450)
PMV BLD AUTO: 8.9 FL (ref 5–10.5)
POTASSIUM SERPL-SCNC: 4.6 MMOL/L (ref 3.5–5.1)
PROTEIN UA: NEGATIVE MG/DL
RBC # BLD: 4.86 M/UL (ref 4.2–5.9)
RBC UA: ABNORMAL /HPF (ref 0–4)
SODIUM BLD-SCNC: 144 MMOL/L (ref 136–145)
SPECIFIC GRAVITY UA: >=1.03 (ref 1–1.03)
URINE TYPE: ABNORMAL
UROBILINOGEN, URINE: 0.2 E.U./DL
WBC # BLD: 8.1 K/UL (ref 4–11)
WBC UA: ABNORMAL /HPF (ref 0–5)

## 2023-03-06 PROCEDURE — 80048 BASIC METABOLIC PNL TOTAL CA: CPT

## 2023-03-06 PROCEDURE — 85027 COMPLETE CBC AUTOMATED: CPT

## 2023-03-06 PROCEDURE — 87086 URINE CULTURE/COLONY COUNT: CPT

## 2023-03-06 PROCEDURE — 81001 URINALYSIS AUTO W/SCOPE: CPT

## 2023-03-06 PROCEDURE — 36415 COLL VENOUS BLD VENIPUNCTURE: CPT

## 2023-03-06 SDOH — ECONOMIC STABILITY: FOOD INSECURITY: WITHIN THE PAST 12 MONTHS, YOU WORRIED THAT YOUR FOOD WOULD RUN OUT BEFORE YOU GOT MONEY TO BUY MORE.: NEVER TRUE

## 2023-03-06 SDOH — ECONOMIC STABILITY: HOUSING INSECURITY
IN THE LAST 12 MONTHS, WAS THERE A TIME WHEN YOU DID NOT HAVE A STEADY PLACE TO SLEEP OR SLEPT IN A SHELTER (INCLUDING NOW)?: NO

## 2023-03-06 SDOH — ECONOMIC STABILITY: INCOME INSECURITY: HOW HARD IS IT FOR YOU TO PAY FOR THE VERY BASICS LIKE FOOD, HOUSING, MEDICAL CARE, AND HEATING?: NOT HARD AT ALL

## 2023-03-06 SDOH — ECONOMIC STABILITY: FOOD INSECURITY: WITHIN THE PAST 12 MONTHS, THE FOOD YOU BOUGHT JUST DIDN'T LAST AND YOU DIDN'T HAVE MONEY TO GET MORE.: NEVER TRUE

## 2023-03-06 ASSESSMENT — PATIENT HEALTH QUESTIONNAIRE - PHQ9
4. FEELING TIRED OR HAVING LITTLE ENERGY: 0
SUM OF ALL RESPONSES TO PHQ QUESTIONS 1-9: 1
6. FEELING BAD ABOUT YOURSELF - OR THAT YOU ARE A FAILURE OR HAVE LET YOURSELF OR YOUR FAMILY DOWN: 0
SUM OF ALL RESPONSES TO PHQ9 QUESTIONS 1 & 2: 0
5. POOR APPETITE OR OVEREATING: 0
8. MOVING OR SPEAKING SO SLOWLY THAT OTHER PEOPLE COULD HAVE NOTICED. OR THE OPPOSITE, BEING SO FIGETY OR RESTLESS THAT YOU HAVE BEEN MOVING AROUND A LOT MORE THAN USUAL: 0
2. FEELING DOWN, DEPRESSED OR HOPELESS: 0
SUM OF ALL RESPONSES TO PHQ QUESTIONS 1-9: 1
9. THOUGHTS THAT YOU WOULD BE BETTER OFF DEAD, OR OF HURTING YOURSELF: 0
SUM OF ALL RESPONSES TO PHQ QUESTIONS 1-9: 1
3. TROUBLE FALLING OR STAYING ASLEEP: 1
7. TROUBLE CONCENTRATING ON THINGS, SUCH AS READING THE NEWSPAPER OR WATCHING TELEVISION: 0
1. LITTLE INTEREST OR PLEASURE IN DOING THINGS: 0
10. IF YOU CHECKED OFF ANY PROBLEMS, HOW DIFFICULT HAVE THESE PROBLEMS MADE IT FOR YOU TO DO YOUR WORK, TAKE CARE OF THINGS AT HOME, OR GET ALONG WITH OTHER PEOPLE: 0
SUM OF ALL RESPONSES TO PHQ QUESTIONS 1-9: 1

## 2023-03-06 ASSESSMENT — ANXIETY QUESTIONNAIRES
IF YOU CHECKED OFF ANY PROBLEMS ON THIS QUESTIONNAIRE, HOW DIFFICULT HAVE THESE PROBLEMS MADE IT FOR YOU TO DO YOUR WORK, TAKE CARE OF THINGS AT HOME, OR GET ALONG WITH OTHER PEOPLE: NOT DIFFICULT AT ALL
7. FEELING AFRAID AS IF SOMETHING AWFUL MIGHT HAPPEN: 0
1. FEELING NERVOUS, ANXIOUS, OR ON EDGE: 0
3. WORRYING TOO MUCH ABOUT DIFFERENT THINGS: 0
5. BEING SO RESTLESS THAT IT IS HARD TO SIT STILL: 0
4. TROUBLE RELAXING: 0
6. BECOMING EASILY ANNOYED OR IRRITABLE: 0
2. NOT BEING ABLE TO STOP OR CONTROL WORRYING: 0
GAD7 TOTAL SCORE: 0

## 2023-03-06 NOTE — PROGRESS NOTES
Preoperative Consultation        [address] , [address], [address] [facility] [place] [address]         Phone: [phone]      Edra Common  YOB: 1960    Date of Service:  3/6/2023    Vitals:    03/06/23 1233   BP: 118/70   Pulse: 70   Temp: 98.4 °F (36.9 °C)   SpO2: 96%   Weight: 204 lb 6.4 oz (92.7 kg)   Height: 5' 7.32\" (1.71 m)      Wt Readings from Last 2 Encounters:   03/06/23 204 lb 6.4 oz (92.7 kg)   01/30/23 201 lb (91.2 kg)     BP Readings from Last 3 Encounters:   03/06/23 118/70   01/30/23 116/70   01/29/21 110/72        Chief Complaint   Patient presents with    Pre-op Exam     Pre-Op- 3/16- [address] [employer] MD- Left Knee Arthroscopy with partial Medial Meniscectomy, Chondral Debridement, and Plica Excision     No Known Allergies  No outpatient medications have been marked as taking for the 3/6/23 encounter (Office Visit) with Bertha Jimenez DO. This patient presents to the office today for a preoperative consultation at the request of surgeon, Dr. Bharat Nye, who plans on performing left meniscus surgery on March 16 at [facility]. The current problem began late last summer and symptoms have been  with time. Patient has not tried any conservative treatments. Planned anesthesia: general anesthesia   Known anesthesia problems: None   Bleeding risk: No recent or remote history of abnormal bleeding  Personal or FH of DVT/PE: No    Patient objection to receiving blood products: No      Patient said that proctalgia fugax is stable. He said that he would have rectal pain once every 9 months. He tries tennis ball trick and it helps with symptoms. Patient has no concerns or complaints. Umbilical hernia  Patient denies any changes in size, color of umbilical hernia.   Patient denies any nausea, vomiting, diarrhea, skin discoloration, fever, chills or other review of system symptoms. Patient denies any other review of system symptoms. Patient denies taking any medications. Patient said that he will get colonoscopy after knee surgery done. Patient Active Problem List   Diagnosis    Proctalgia fugax    Umbilical hernia without obstruction and without gangrene       Past Medical History:   Diagnosis Date    Cataract     Degeneration of lumbar intervertebral disc     Fuchs endothelial dystrophy     Umbilical hernia      Past Surgical History:   Procedure Laterality Date    CATARACT REMOVAL WITH IMPLANT      COLONOSCOPY  2/2015    polyp    EYE SURGERY Bilateral     phaco with iol    INGUINAL HERNIA REPAIR  child     bilateral    TONSILLECTOMY AND ADENOIDECTOMY      WISDOM TOOTH EXTRACTION       Family History   Problem Relation Age of Onset    High Blood Pressure Mother     High Cholesterol Mother     Cancer Mother         breast dx mid 63's    High Blood Pressure Father     High Cholesterol Father     Stroke Father         x3, 1st mid 66's     Social History     Socioeconomic History    Marital status: Single     Spouse name: Not on file    Number of children: Not on file    Years of education: Not on file    Highest education level: Not on file   Occupational History    Not on file   Tobacco Use    Smoking status: Never    Smokeless tobacco: Never   Substance and Sexual Activity    Alcohol use:  Yes     Alcohol/week: 3.0 standard drinks     Types: 1 Standard drinks or equivalent, 2 Cans of beer per week    Drug use: No    Sexual activity: Yes     Partners: Female   Other Topics Concern    Not on file   Social History Narrative    , went to [address] Strain: Low Risk     Difficulty of Paying Living Expenses: Not hard at all   Food Insecurity: No Food Insecurity    Worried About [address] [facility] in the Last Year: Never true    [address] in the Last Year: Never true   Transportation Needs: Unknown    Lack of Transportation (Medical): Not on file    Lack of Transportation (Non-Medical): No   Physical Activity: Not on file   Stress: Not on file   Social Connections: Not on file   Intimate Partner Violence: Not on file   Housing Stability: Unknown    Unable to Pay for Housing in the Last Year: Not on file    Number of Places Lived in the Last Year: Not on file    Unstable Housing in the Last Year: No       Review of Systems  Constitutional:  Negative for activity or appetite change, fever or fatigue  HENT:  Negative for congestion, sinus pressure, or rhinorrhea  Eyes:  Negative for eye pain or visual changes  Resp:  Negative for SOB, chest tightness, cough  Cardiovascular: Negative for CP, palpitations, LANG, orthopnea, PND, LE edema  Gastrointestinal: Negative for abd pain, melena, BRBPR, N/V/D  Endocrine:  Negative for polydipsia and polyuria  :  Negative for dysuria, flank pain or urinary frequency  Musculoskeletal:  Negative for back pain or myalgias  Neuro:  Negative for dizziness or lightheadedness  Psych: negative for depression or anxiety       Physical Exam   Constitutional: He is oriented to person, place, and time. He appears well-developed and well-nourished. No distress. HENT:   Head: Normocephalic and atraumatic. Mouth/Throat: Uvula is midline, oropharynx is clear and moist and mucous membranes are normal.   Eyes: Conjunctivae and EOM are normal. Pupils are equal, round, and reactive to light. Neck: Trachea normal and normal range of motion. Neck supple. No JVD present. Carotid bruit is not present. No mass and no thyromegaly present. Cardiovascular: Normal rate, regular rhythm, normal heart sounds and intact distal pulses. Exam reveals no gallop and no friction rub. No murmur heard. Pulmonary/Chest: Effort normal and breath sounds normal. No respiratory distress. He has no wheezes. He has no rales.    Abdominal: 2 x 2 umbilical hernia present, reducible, not incarcerated, not strangulated. Soft. Normal bowel sounds are normal. He exhibits no distension and no mass. There is no hepatosplenomegaly. No tenderness. Musculoskeletal: He exhibits no edema and no tenderness. Neurological: He is alert and oriented to person, place, and time. He has normal strength. No cranial nerve deficit or sensory deficit. Coordination and gait normal.   Skin: Skin is warm and dry. No rash noted. No erythema. Psychiatric: He has a normal mood and affect. His behavior is normal.     EKG Interpretation:  normal sinus rhythm, nonspecific ST and T waves changes, prolonged QT interval at 450 ms, unchanged from previous tracings. No evidence of ischemic findings. Lab Review   Hospital Outpatient Visit on 01/25/2023   Component Date Value    Testosterone 01/25/2023 396     Sex Hormone Binding 01/25/2023 24     Testosterone, Free 01/25/2023 95.5     PSA 01/25/2023 1.75     Cholesterol, Total 01/25/2023 187     Triglycerides 01/25/2023 133     HDL 01/25/2023 50     LDL Calculated 01/25/2023 110 (A)     VLDL Cholesterol Calcula* 01/25/2023 27     Hemoglobin A1C 01/25/2023 5.1     eAG 01/25/2023 99.7            Assessment/Plan:           Pre-operative evaluation  Complex tear of medial meniscus of left knee  RCRI risk score 0 points, class I risk. 3.9% 30-day risk of death, MI, or cardiac arrest.     Functional Capacity:  Determination not indicated as patient is low risk of MACE based on combined clinical and surgical risks. Preoperative workup as follows: none    Change current medications as follows: ----None    Prophylaxis for cardiac events with   A)  perioperative beta-blockers: Not indicated  B)  perioperative statins: not indicated    Deep vein thrombosis prophylaxis: regimen to be chosen by surgical team       Proctalgia fugax  - Controlled. - Continue use a tennis ball trick as needed for symptom relief. -Return to clinic if symptoms get worse.     Umbilical hernia  - Controlled  -Advised patient to return to clinic if you would like a referral to general surgery. -Advised patient to return to clinic if symptoms got worse. Known risk factors for perioperative complications: None      As outlined above, measures were taken to assess risk, and decrease risk when possible. Risks of surgery were discussed with patient, and benefits believed to outweigh risks. Patient is making an informed decision to proceed with surgery with an understanding of any risk,  Please follow all pre-op recommendations above. Patient care was discussed with Dr. Omer Glynn . Thank you for your supervision.        Kiet [place]  3/6/2023

## 2023-03-07 LAB — URINE CULTURE, ROUTINE: NORMAL

## 2023-03-30 ENCOUNTER — OFFICE VISIT (OUTPATIENT)
Dept: PRIMARY CARE CLINIC | Age: 63
End: 2023-03-30

## 2023-03-30 VITALS
HEART RATE: 65 BPM | DIASTOLIC BLOOD PRESSURE: 72 MMHG | SYSTOLIC BLOOD PRESSURE: 126 MMHG | TEMPERATURE: 98.1 F | OXYGEN SATURATION: 97 % | WEIGHT: 204 LBS | RESPIRATION RATE: 17 BRPM | BODY MASS INDEX: 31.65 KG/M2

## 2023-03-30 DIAGNOSIS — K59.4 PROCTALGIA FUGAX: ICD-10-CM

## 2023-03-30 DIAGNOSIS — K42.9 UMBILICAL HERNIA WITHOUT OBSTRUCTION AND WITHOUT GANGRENE: ICD-10-CM

## 2023-03-30 DIAGNOSIS — Z01.818 PRE-OP EVALUATION: Primary | ICD-10-CM

## 2023-03-30 DIAGNOSIS — S83.242S ACUTE MEDIAL MENISCUS TEAR, LEFT, SEQUELA: ICD-10-CM

## 2023-03-30 ASSESSMENT — ANXIETY QUESTIONNAIRES
5. BEING SO RESTLESS THAT IT IS HARD TO SIT STILL: 0-NOT AT ALL
2. NOT BEING ABLE TO STOP OR CONTROL WORRYING: 0-NOT AT ALL
6. BECOMING EASILY ANNOYED OR IRRITABLE: 0-NOT AT ALL
4. TROUBLE RELAXING: 0-NOT AT ALL
1. FEELING NERVOUS, ANXIOUS, OR ON EDGE: 0
3. WORRYING TOO MUCH ABOUT DIFFERENT THINGS: 0-NOT AT ALL
7. FEELING AFRAID AS IF SOMETHING AWFUL MIGHT HAPPEN: 0-NOT AT ALL
GAD7 TOTAL SCORE: 0

## 2023-03-30 ASSESSMENT — PATIENT HEALTH QUESTIONNAIRE - PHQ9
7. TROUBLE CONCENTRATING ON THINGS, SUCH AS READING THE NEWSPAPER OR WATCHING TELEVISION: 0
10. IF YOU CHECKED OFF ANY PROBLEMS, HOW DIFFICULT HAVE THESE PROBLEMS MADE IT FOR YOU TO DO YOUR WORK, TAKE CARE OF THINGS AT HOME, OR GET ALONG WITH OTHER PEOPLE: 0
8. MOVING OR SPEAKING SO SLOWLY THAT OTHER PEOPLE COULD HAVE NOTICED. OR THE OPPOSITE, BEING SO FIGETY OR RESTLESS THAT YOU HAVE BEEN MOVING AROUND A LOT MORE THAN USUAL: 0
SUM OF ALL RESPONSES TO PHQ QUESTIONS 1-9: 0
3. TROUBLE FALLING OR STAYING ASLEEP: 0
4. FEELING TIRED OR HAVING LITTLE ENERGY: 0
SUM OF ALL RESPONSES TO PHQ QUESTIONS 1-9: 0
5. POOR APPETITE OR OVEREATING: 0
6. FEELING BAD ABOUT YOURSELF - OR THAT YOU ARE A FAILURE OR HAVE LET YOURSELF OR YOUR FAMILY DOWN: 0
2. FEELING DOWN, DEPRESSED OR HOPELESS: 0
9. THOUGHTS THAT YOU WOULD BE BETTER OFF DEAD, OR OF HURTING YOURSELF: 0

## 2023-03-30 NOTE — PROGRESS NOTES
03/06/2023 101 (A)     BUN 03/06/2023 15     Creatinine 03/06/2023 1.0     Est, Glom Filt Rate 03/06/2023 >60     Calcium 03/06/2023 9.3            Assessment/Plan:   Pre-operative evaluation  Complex tear of medial meniscus of left knee  RCRI risk score 0 points, class I risk. 3.9% 30-day risk of death, MI, or cardiac arrest.     Functional Capacity:  Determination not indicated as patient is low risk of MACE based on combined clinical and surgical risks. Preoperative workup as follows: none    Change current medications as follows: ----None    Prophylaxis for cardiac events with   A)  perioperative beta-blockers: Not indicated  B)  perioperative statins: not indicated    Deep vein thrombosis prophylaxis: regimen to be chosen by surgical team       Proctalgia fugax  - Controlled. - Continue use a tennis ball trick as needed for symptom relief. -Return to clinic if symptoms get worse. Umbilical hernia  - Controlled  -Advised patient to return to clinic if you would like a referral to general surgery. -Advised patient to return to clinic if symptoms got worse. Known risk factors for perioperative complications: None      As outlined above, measures were taken to assess risk, and decrease risk when possible. Risks of surgery were discussed with patient, and benefits believed to outweigh risks. Patient is making an informed decision to proceed with surgery with an understanding of any risk,  Please follow all pre-op recommendations above. Patient care was discussed with Margareth Jamison. Thank you for your supervision.        [place]iet, [place]  [id number]

## 2023-03-31 ENCOUNTER — TELEPHONE (OUTPATIENT)
Dept: PRIMARY CARE CLINIC | Age: 63
End: 2023-03-31

## 2023-03-31 NOTE — TELEPHONE ENCOUNTER
Desiree Moran is calling they are need the patients H&P Signed and it needs to state cleared for surgery. They will loyd need a copy of the EKG from 3/6 to be faxed with it.     Ph [phone] option 8  Fx [phone]

## 2023-04-29 PROBLEM — Z01.818 PRE-OP EVALUATION: Status: RESOLVED | Noted: 2023-03-30 | Resolved: 2023-04-29

## 2024-08-12 ASSESSMENT — PATIENT HEALTH QUESTIONNAIRE - PHQ9
SUM OF ALL RESPONSES TO PHQ9 QUESTIONS 1 & 2: 0
SUM OF ALL RESPONSES TO PHQ9 QUESTIONS 1 & 2: 0
2. FEELING DOWN, DEPRESSED OR HOPELESS: NOT AT ALL
2. FEELING DOWN, DEPRESSED OR HOPELESS: NOT AT ALL
1. LITTLE INTEREST OR PLEASURE IN DOING THINGS: NOT AT ALL
SUM OF ALL RESPONSES TO PHQ QUESTIONS 1-9: 0
1. LITTLE INTEREST OR PLEASURE IN DOING THINGS: NOT AT ALL
SUM OF ALL RESPONSES TO PHQ QUESTIONS 1-9: 0

## 2024-08-13 ENCOUNTER — OFFICE VISIT (OUTPATIENT)
Dept: PRIMARY CARE CLINIC | Age: 64
End: 2024-08-13
Payer: COMMERCIAL

## 2024-08-13 VITALS
OXYGEN SATURATION: 97 % | WEIGHT: 192 LBS | SYSTOLIC BLOOD PRESSURE: 116 MMHG | RESPIRATION RATE: 18 BRPM | HEART RATE: 62 BPM | TEMPERATURE: 98 F | DIASTOLIC BLOOD PRESSURE: 72 MMHG | BODY MASS INDEX: 30.13 KG/M2 | HEIGHT: 67 IN

## 2024-08-13 DIAGNOSIS — Z00.00 ENCOUNTER FOR WELL ADULT EXAM WITHOUT ABNORMAL FINDINGS: Primary | ICD-10-CM

## 2024-08-13 DIAGNOSIS — M25.552 BILATERAL HIP PAIN: ICD-10-CM

## 2024-08-13 DIAGNOSIS — M25.551 BILATERAL HIP PAIN: ICD-10-CM

## 2024-08-13 PROCEDURE — 99396 PREV VISIT EST AGE 40-64: CPT | Performed by: FAMILY MEDICINE

## 2024-08-13 SDOH — ECONOMIC STABILITY: FOOD INSECURITY: WITHIN THE PAST 12 MONTHS, THE FOOD YOU BOUGHT JUST DIDN'T LAST AND YOU DIDN'T HAVE MONEY TO GET MORE.: NEVER TRUE

## 2024-08-13 SDOH — ECONOMIC STABILITY: INCOME INSECURITY: HOW HARD IS IT FOR YOU TO PAY FOR THE VERY BASICS LIKE FOOD, HOUSING, MEDICAL CARE, AND HEATING?: NOT VERY HARD

## 2024-08-13 SDOH — ECONOMIC STABILITY: FOOD INSECURITY: WITHIN THE PAST 12 MONTHS, YOU WORRIED THAT YOUR FOOD WOULD RUN OUT BEFORE YOU GOT MONEY TO BUY MORE.: NEVER TRUE

## 2024-08-13 ASSESSMENT — ENCOUNTER SYMPTOMS
NAUSEA: 0
DIARRHEA: 0
CONSTIPATION: 0
VOMITING: 0
COUGH: 0
SHORTNESS OF BREATH: 0
WHEEZING: 0

## 2024-08-13 NOTE — PROGRESS NOTES
Well Adult Note  Name: Popeye Mariscal Today’s Date: 2024   MRN: [id number] Sex: Male   Age: 64 y.o. Ethnicity: Non- / Non    : 1960 Race: White (non-)      Popeye Mariscal is here for a well adult exam.       Subjective   History:  Fall back in march  Having pelvic pain that is ongoing  Midline pain that is suprapubic  Exacerbated with external rotation of hips    Dietary chagnes to reduce carbs    Requesting testosterone lab work  Sexual sensitivity has reduced  No concerns with function    Colon cancer screening completed  No polyps  Recommended 10 year follow up  Will extrapolate and update charts    Review of Systems   Constitutional:  Negative for chills, fatigue and fever.   HENT:  Negative for congestion and tinnitus.    Eyes:  Negative for visual disturbance.   Respiratory:  Negative for cough, shortness of breath and wheezing.    Cardiovascular:  Negative for chest pain, palpitations and leg swelling.   Gastrointestinal:  Negative for constipation, diarrhea, nausea and vomiting.   Genitourinary:  Negative for difficulty urinating, dysuria, flank pain and hematuria.   Neurological:  Negative for dizziness, weakness, light-headedness, numbness and headaches.       No Known Allergies  Prior to Visit Medications    Not on File     Past Medical History:   Diagnosis Date    Cataract     Degeneration of lumbar intervertebral disc     Fuchs endothelial dystrophy     Umbilical hernia      Past Surgical History:   Procedure Laterality Date    CATARACT REMOVAL WITH IMPLANT      COLONOSCOPY  2015    polyp    EYE SURGERY Bilateral     phaco with iol    INGUINAL HERNIA REPAIR  child     bilateral    TONSILLECTOMY AND ADENOIDECTOMY      WISDOM TOOTH EXTRACTION       Family History   Problem Relation Age of Onset    High Blood Pressure Mother     High Cholesterol Mother     Cancer Mother         breast dx mid 60's    High Blood Pressure Father     High Cholesterol Father     Stroke Father

## 2024-08-15 ENCOUNTER — HOSPITAL ENCOUNTER (OUTPATIENT)
Age: 64
Setting detail: SPECIMEN
Discharge: HOME | End: 2024-08-15
Payer: COMMERCIAL

## 2024-08-15 DIAGNOSIS — Z00.00 ENCOUNTER FOR WELL ADULT EXAM WITHOUT ABNORMAL FINDINGS: ICD-10-CM

## 2024-08-15 LAB
ALBUMIN SERPL-MCNC: 4.2 G/DL (ref 3.4–5)
ALBUMIN/GLOB SERPL: 1.8 {RATIO} (ref 1.1–2.2)
ALP SERPL-CCNC: 70 U/L (ref 40–129)
ALT SERPL-CCNC: 24 U/L (ref 10–40)
ANION GAP SERPL CALCULATED.3IONS-SCNC: 10 MMOL/L (ref 3–16)
AST SERPL-CCNC: 22 U/L (ref 15–37)
BILIRUB SERPL-MCNC: 0.5 MG/DL (ref 0–1)
BUN SERPL-MCNC: 14 MG/DL (ref 7–20)
CALCIUM SERPL-MCNC: 9.2 MG/DL (ref 8.3–10.6)
CHLORIDE SERPL-SCNC: 104 MMOL/L (ref 99–110)
CHOLEST SERPL-MCNC: 191 MG/DL (ref 0–199)
CO2 SERPL-SCNC: 26 MMOL/L (ref 21–32)
CREAT SERPL-MCNC: 1 MG/DL (ref 0.8–1.3)
EST. AVERAGE GLUCOSE BLD GHB EST-MCNC: 93.9 MG/DL
GFR SERPLBLD CREATININE-BSD FMLA CKD-EPI: 84 ML/MIN/{1.73_M2}
GLUCOSE SERPL-MCNC: 83 MG/DL (ref 70–99)
HBA1C MFR BLD: 4.9 %
HDLC SERPL-MCNC: 57 MG/DL (ref 40–60)
LDLC SERPL CALC-MCNC: 121 MG/DL
POTASSIUM SERPL-SCNC: 4.4 MMOL/L (ref 3.5–5.1)
PROT SERPL-MCNC: 6.5 G/DL (ref 6.4–8.2)
PSA SERPL DL<=0.01 NG/ML-MCNC: 1.43 NG/ML (ref 0–4)
SODIUM SERPL-SCNC: 140 MMOL/L (ref 136–145)
TRIGL SERPL-MCNC: 66 MG/DL (ref 0–150)
VLDLC SERPL CALC-MCNC: 13 MG/DL

## 2024-08-15 PROCEDURE — 80061 LIPID PANEL: CPT

## 2024-08-15 PROCEDURE — 84270 ASSAY OF SEX HORMONE GLOBUL: CPT

## 2024-08-15 PROCEDURE — 83036 HEMOGLOBIN GLYCOSYLATED A1C: CPT

## 2024-08-15 PROCEDURE — 84403 ASSAY OF TOTAL TESTOSTERONE: CPT

## 2024-08-15 PROCEDURE — 84153 ASSAY OF PSA TOTAL: CPT

## 2024-08-15 PROCEDURE — 80053 COMPREHEN METABOLIC PANEL: CPT

## 2024-08-15 PROCEDURE — 36415 COLL VENOUS BLD VENIPUNCTURE: CPT

## 2024-08-16 LAB
SHBG SERPL-SCNC: 45 NMOL/L (ref 19–76)
TESTOST FREE SERPL-MCNC: 75.7 PG/ML (ref 47–244)
TESTOST SERPL-MCNC: 447 NG/DL (ref 193–740)

## 2024-08-19 DIAGNOSIS — E78.5 HYPERLIPIDEMIA, UNSPECIFIED HYPERLIPIDEMIA TYPE: Primary | ICD-10-CM

## 2024-08-19 RX ORDER — ATORVASTATIN CALCIUM 20 MG/1
20 TABLET, FILM COATED ORAL DAILY
Qty: 90 TABLET | Refills: 1 | Status: SHIPPED | OUTPATIENT
Start: 2024-08-19

## 2024-08-26 ENCOUNTER — TELEPHONE (OUTPATIENT)
Dept: PRIMARY CARE CLINIC | Age: 64
End: 2024-08-26